# Patient Record
Sex: FEMALE | Race: WHITE | Employment: FULL TIME | URBAN - METROPOLITAN AREA
[De-identification: names, ages, dates, MRNs, and addresses within clinical notes are randomized per-mention and may not be internally consistent; named-entity substitution may affect disease eponyms.]

---

## 2017-05-12 ENCOUNTER — HOSPITAL ENCOUNTER (OUTPATIENT)
Dept: LAB | Age: 52
Discharge: HOME | End: 2017-05-12

## 2017-05-12 PROCEDURE — 99001 SPECIMEN HANDLING PT-LAB: CPT | Performed by: FAMILY MEDICINE

## 2018-06-07 ENCOUNTER — HOSPITAL ENCOUNTER (OUTPATIENT)
Dept: ULTRASOUND IMAGING | Age: 53
Discharge: HOME | End: 2018-06-07
Attending: FAMILY MEDICINE
Payer: COMMERCIAL

## 2018-06-07 ENCOUNTER — HOSPITAL ENCOUNTER (OUTPATIENT)
Dept: MAMMOGRAPHY | Age: 53
Discharge: HOME | End: 2018-06-07
Attending: FAMILY MEDICINE
Payer: COMMERCIAL

## 2018-06-07 DIAGNOSIS — N28.1 RENAL CYST: ICD-10-CM

## 2018-06-07 DIAGNOSIS — Z12.31 VISIT FOR SCREENING MAMMOGRAM: ICD-10-CM

## 2018-06-07 PROCEDURE — 77063 BREAST TOMOSYNTHESIS BI: CPT

## 2018-06-07 PROCEDURE — 76775 US EXAM ABDO BACK WALL LIM: CPT

## 2018-06-07 PROCEDURE — 77067 SCR MAMMO BI INCL CAD: CPT

## 2018-06-28 ENCOUNTER — APPOINTMENT (OUTPATIENT)
Dept: VASCULAR SURGERY | Age: 53
End: 2018-06-28
Attending: PHYSICIAN ASSISTANT
Payer: COMMERCIAL

## 2018-06-28 ENCOUNTER — HOSPITAL ENCOUNTER (EMERGENCY)
Age: 53
Discharge: HOME | End: 2018-06-28
Attending: EMERGENCY MEDICINE
Payer: COMMERCIAL

## 2018-06-28 VITALS
DIASTOLIC BLOOD PRESSURE: 89 MMHG | SYSTOLIC BLOOD PRESSURE: 132 MMHG | TEMPERATURE: 97.9 F | OXYGEN SATURATION: 100 % | BODY MASS INDEX: 26.68 KG/M2 | WEIGHT: 145 LBS | HEIGHT: 62 IN | HEART RATE: 102 BPM | RESPIRATION RATE: 16 BRPM

## 2018-06-28 DIAGNOSIS — M25.562 ARTHRALGIA OF LEFT LOWER LEG: Primary | ICD-10-CM

## 2018-06-28 PROCEDURE — 93971 EXTREMITY STUDY: CPT

## 2018-06-28 PROCEDURE — 99281 EMR DPT VST MAYX REQ PHY/QHP: CPT

## 2018-06-28 RX ORDER — SUMATRIPTAN 100 MG/1
100 TABLET, FILM COATED ORAL
COMMUNITY

## 2018-06-28 RX ORDER — SPIRONOLACTONE 25 MG/1
TABLET ORAL DAILY
COMMUNITY

## 2018-06-28 RX ORDER — TOPIRAMATE 100 MG/1
TABLET, FILM COATED ORAL 2 TIMES DAILY
COMMUNITY

## 2018-06-28 RX ORDER — ONDANSETRON 4 MG/1
4 TABLET, FILM COATED ORAL
COMMUNITY

## 2018-06-28 NOTE — DISCHARGE INSTRUCTIONS
Musculoskeletal Pain in Children: Care Instructions  Your Care Instructions    Different problems with the bones, muscles, nerves, ligaments, and tendons in the body can cause pain. One or more areas of your child's body may ache or burn, or feel tired, stiff, or sore. The medical term for this type of pain is musculoskeletal pain. It can have many different causes. In some cases, the cause of the pain is another health problem. Sometimes the pain is caused by an injury such as a strain or sprain. Or the pain can be from using one part of the body in the same way over and over again. This is called overuse. To help find the cause of your child's pain, the doctor examines your child and asks questions about his or her health. Blood tests or imaging tests like an X-ray may also be helpful. But sometimes doctors can't find a cause of the pain. Treatment depends on your child's symptoms and the cause of the pain, if known. The doctor has checked your child carefully, but problems can develop later. If you notice any problems or new symptoms, get medical treatment right away. Follow-up care is a key part of your child's treatment and safety. Be sure to make and go to all appointments, and call your doctor if your child is having problems. It's also a good idea to know your child's test results and keep a list of the medicines your child takes. How can you care for your child at home? Encourage your child to:  · Rest until he or she feels better. · Avoid anything that makes the pain worse. Your child can gradually be more active when he or she feels better and the doctor says it's okay. To help treat your child's pain:  · Be safe with medicines. Read and follow all instructions on the label. ¨ If the doctor gave your child a prescription medicine for pain, give it as prescribed.   ¨ If your child is not taking a prescription pain medicine, ask your doctor if your child can take an over-the-counter medicine. · Put ice or a cold pack on the area for 10 to 20 minutes at a time to ease pain. Put a thin cloth between the ice and your child's skin. When should you call for help? Call your child's doctor now or seek immediate medical care if:  ? · Your child has new pain, or your child's pain gets worse. ? · Your child has new symptoms such as a fever, a rash, or chills. ? Watch closely for changes in your child's health, and be sure to contact your doctor if:  ? · Your child does not get better as expected. Where can you learn more? Go to http://[facility]-antonina.info/. Enter V900 in the search box to learn more about \"Musculoskeletal Pain in Children: Care Instructions. \"  Current as of: October 14, 2016  Content Version: 11.4  © 8721-1497 [employer]. Care instructions adapted under license by [employer] (which disclaims liability or warranty for this information). If you have questions about a medical condition or this instruction, always ask your healthcare professional. [address] any warranty or liability for your use of this information.

## 2018-06-28 NOTE — ED PROVIDER NOTES
HPI Comments: Pt states she developed pain in left medial thigh and has since progressed to left lower leg. 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 No on blood thinners now. Denies f/c, weakness, dizziness/headache, LOC, confusion, neck pain, stiff neck, cp, palps,, cough, abd pain, n/v/d, back pain, extremity (unilateral) numbness/tingling/weakness, calf swelling/pain. Patient is a 46 y.o. female presenting with leg pain. The history is provided by the patient. Leg Pain    This is a new problem. The current episode started more than 1 week ago. The problem occurs constantly. The problem has not changed since onset. The pain is present in the left upper leg and left lower leg. The quality of the pain is described as aching and constant. The pain is moderate. Associated symptoms include itching. Pertinent negatives include no numbness, full range of motion, no stiffness, no tingling, no back pain and no neck pain. The symptoms are aggravated by palpation and movement. She has tried nothing for the symptoms. The treatment provided no relief. There has been no history of extremity trauma. Past Medical History:   Diagnosis Date    Asthma     Menopause        Past Surgical History:   Procedure Laterality Date    HX BREAST BIOPSY Right 2/2013    Benign         History reviewed. No pertinent family history. Social History     Social History    Marital status: SINGLE     Spouse name: N/A    Number of children: N/A    Years of education: N/A     Occupational History    Not on file. Social History Main Topics    Smoking status: Never Smoker    Smokeless tobacco: Never Used    Alcohol use Not on file    Drug use: Not on file    Sexual activity: Not on file     Other Topics Concern    Not on file     Social History Narrative         ALLERGIES: Aspirin    Review of Systems   Constitutional: Negative for chills and fever. HENT: Negative. Eyes: Negative.     Respiratory: Negative for cough and shortness of breath. Cardiovascular: Negative for chest pain and palpitations. Gastrointestinal: Negative for abdominal pain, constipation, diarrhea, nausea and vomiting. Genitourinary: Negative. Musculoskeletal: Negative for back pain, neck pain and stiffness. Skin: Positive for itching. Allergic/Immunologic: Negative. Neurological: Negative for dizziness, tingling, weakness, numbness and headaches. Psychiatric/Behavioral: Negative. All other systems reviewed and are negative. Vitals:    06/28/18 0941 06/28/18 1208   BP: (!) 147/100 132/89   Pulse: (!) 116 (!) 102   Resp: 16 16   Temp: 97.9 °F (36.6 °C)    SpO2: 100%    Weight: 65.8 kg (145 lb)    Height: 5' 2\" (1.575 m)             Physical Exam   Constitutional: She is oriented to person, place, and time. She appears well-developed and well-nourished. No distress. NAD, well hydrated, non toxic     HENT:   Head: Normocephalic and atraumatic. Nose: Nose normal.   Mouth/Throat: Oropharynx is clear and moist. No oropharyngeal exudate. Eyes: Conjunctivae and EOM are normal. Pupils are equal, round, and reactive to light. Neck: Normal range of motion. Neck supple. Cardiovascular: Normal rate, regular rhythm and normal heart sounds. No murmur heard. Pulmonary/Chest: Effort normal and breath sounds normal. No respiratory distress. She has no wheezes. She has no rales. Abdominal: Soft. She exhibits no distension. There is no tenderness. There is no guarding. Musculoskeletal: Normal range of motion. Left knee: Normal. She exhibits normal range of motion, no swelling, no effusion, no ecchymosis, no deformity, no laceration, no erythema, normal alignment and no LCL laxity. No tenderness found. Right ankle: Normal.        Left ankle: Normal. Achilles tendon normal. Achilles tendon exhibits normal Ul's test results. Left upper leg: She exhibits tenderness.  She exhibits no bony tenderness, no swelling, no edema, no deformity and no laceration. Left lower leg: She exhibits tenderness. She exhibits no bony tenderness, no swelling, no edema, no deformity and no laceration. Legs:  Lymphadenopathy:     She has no cervical adenopathy. Neurological: She is alert and oriented to person, place, and time. No cranial nerve deficit. Coordination normal.   Skin: Skin is warm. No rash noted. She is not diaphoretic. Psychiatric: She has a normal mood and affect. Her behavior is normal.   Nursing note and vitals reviewed. MDM  Number of Diagnoses or Management Options  Arthralgia of left lower leg:   Diagnosis management comments: Pt here with concern for DVT as she has h/o these in UE. States pain x few weeks but not sure what caused as she denies injury/trauma. US negative for DVT. Will tx as muscle pain, no swelling or bony pain, no injury to warrant imaging. NVI. Pt very anxious initially so at DC HR and BP improved.         Amount and/or Complexity of Data Reviewed  Tests in the radiology section of CPT®: ordered and reviewed          ED Course       Procedures

## 2018-06-28 NOTE — ED NOTES
Patient seen by provider only. This RN only discharged patient. I have reviewed discharge instructions with the patient. The patient verbalized understanding. Patient NAD, VSS. Patient armband removed and shredded.

## 2018-06-29 ENCOUNTER — HOSPITAL ENCOUNTER (OUTPATIENT)
Dept: ULTRASOUND IMAGING | Age: 53
Discharge: HOME | End: 2018-06-29
Attending: FAMILY MEDICINE
Payer: COMMERCIAL

## 2018-06-29 ENCOUNTER — HOSPITAL ENCOUNTER (OUTPATIENT)
Dept: MAMMOGRAPHY | Age: 53
Discharge: HOME | End: 2018-06-29
Attending: FAMILY MEDICINE
Payer: COMMERCIAL

## 2018-06-29 DIAGNOSIS — R92.1 BREAST CALCIFICATIONS ON MAMMOGRAM: ICD-10-CM

## 2018-06-29 DIAGNOSIS — R92.2 INCONCLUSIVE MAMMOGRAM: ICD-10-CM

## 2018-06-29 PROCEDURE — 77061 BREAST TOMOSYNTHESIS UNI: CPT
